# Patient Record
Sex: MALE | Race: OTHER | Employment: OTHER | URBAN - METROPOLITAN AREA
[De-identification: names, ages, dates, MRNs, and addresses within clinical notes are randomized per-mention and may not be internally consistent; named-entity substitution may affect disease eponyms.]

---

## 2022-10-18 ENCOUNTER — HOSPITAL ENCOUNTER (EMERGENCY)
Dept: ULTRASOUND IMAGING | Age: 27
Discharge: HOME | End: 2022-10-21

## 2022-10-18 ENCOUNTER — HOSPITAL ENCOUNTER (EMERGENCY)
Dept: CT IMAGING | Age: 27
Discharge: HOME | End: 2022-10-21

## 2022-10-18 ENCOUNTER — HOSPITAL ENCOUNTER (EMERGENCY)
Age: 27
Discharge: HOME | End: 2022-10-18
Attending: EMERGENCY MEDICINE

## 2022-10-18 VITALS
BODY MASS INDEX: 23.54 KG/M2 | RESPIRATION RATE: 16 BRPM | SYSTOLIC BLOOD PRESSURE: 119 MMHG | DIASTOLIC BLOOD PRESSURE: 102 MMHG | HEART RATE: 84 BPM | HEIGHT: 67 IN | OXYGEN SATURATION: 100 % | WEIGHT: 150 LBS | TEMPERATURE: 98 F

## 2022-10-18 DIAGNOSIS — N45.1 EPIDIDYMITIS: Primary | ICD-10-CM

## 2022-10-18 PROCEDURE — 6370000000 HC RX 637 (ALT 250 FOR IP): Performed by: EMERGENCY MEDICINE

## 2022-10-18 PROCEDURE — 96372 THER/PROPH/DIAG INJ SC/IM: CPT

## 2022-10-18 PROCEDURE — 2500000003 HC RX 250 WO HCPCS: Performed by: EMERGENCY MEDICINE

## 2022-10-18 PROCEDURE — 87491 CHLMYD TRACH DNA AMP PROBE: CPT

## 2022-10-18 PROCEDURE — 6360000002 HC RX W HCPCS: Performed by: EMERGENCY MEDICINE

## 2022-10-18 PROCEDURE — 99284 EMERGENCY DEPT VISIT MOD MDM: CPT

## 2022-10-18 PROCEDURE — 81003 URINALYSIS AUTO W/O SCOPE: CPT

## 2022-10-18 PROCEDURE — 74176 CT ABD & PELVIS W/O CONTRAST: CPT

## 2022-10-18 PROCEDURE — 76870 US EXAM SCROTUM: CPT

## 2022-10-18 RX ORDER — DOXYCYCLINE HYCLATE 100 MG/1
100 CAPSULE ORAL EVERY 12 HOURS SCHEDULED
Status: DISCONTINUED | OUTPATIENT
Start: 2022-10-18 | End: 2022-10-18

## 2022-10-18 RX ORDER — DOXYCYCLINE HYCLATE 100 MG
100 TABLET ORAL 2 TIMES DAILY
Qty: 14 TABLET | Refills: 0 | Status: SHIPPED | OUTPATIENT
Start: 2022-10-18 | End: 2022-10-25

## 2022-10-18 RX ORDER — DOXYCYCLINE HYCLATE 100 MG/1
100 CAPSULE ORAL
Status: COMPLETED | OUTPATIENT
Start: 2022-10-18 | End: 2022-10-18

## 2022-10-18 RX ADMIN — DOXYCYCLINE HYCLATE 100 MG: 100 CAPSULE ORAL at 16:23

## 2022-10-18 RX ADMIN — LIDOCAINE HYDROCHLORIDE 500 MG: 10 INJECTION, SOLUTION INFILTRATION; PERINEURAL at 16:23

## 2022-10-18 ASSESSMENT — PAIN SCALES - GENERAL: PAINLEVEL_OUTOF10: 8

## 2022-10-18 NOTE — ED PROVIDER NOTES
Emergency Department Provider Note                   PCP:                None Provider               Age: 24 y.o. Sex: male       ICD-10-CM    1. Epididymitis  N45.1           DISPOSITION Decision To Discharge 10/18/2022 04:05:14 PM        MDM  Number of Diagnoses or Management Options  Epididymitis  Diagnosis management comments: Epididymitis. No sign of torsion or abscess. No hernia.  1 dose of 500 mg IM Rocephin given here. Doxycycline given here. Discharged with doxycycline prescription. Patient waiting over 4 hours and still no urine obtained from nursing. Will not make patient wait any longer as this is most likely STD in this demographic. Amount and/or Complexity of Data Reviewed  Tests in the radiology section of CPT®: ordered and reviewed    Risk of Complications, Morbidity, and/or Mortality  Presenting problems: moderate  Diagnostic procedures: moderate  Management options: moderate    Patient Progress  Patient progress: stable             Orders Placed This Encounter   Procedures    C.trachomatis N.gonorrhoeae DNA    US SCROTUM AND TESTICLES    CT ABDOMEN PELVIS WO CONTRAST Additional Contrast? None    POCT Urine Dipstick    POCT Urinalysis no Micro        Medications   cefTRIAXone (ROCEPHIN) 500 mg in lidocaine 1 % 1.4286 mL IM Injection (500 mg IntraMUSCular Given 10/18/22 1623)   doxycycline hyclate (VIBRAMYCIN) capsule 100 mg (100 mg Oral Given 10/18/22 1623)       New Prescriptions    DOXYCYCLINE HYCLATE (VIBRA-TABS) 100 MG TABLET    Take 1 tablet by mouth 2 times daily for 7 days        Junior Valery Valdes is a 24 y.o. male who presents to the Emergency Department with chief complaint of    Chief Complaint   Patient presents with    Testicle Pain      29-year-old male comes in with testicular pain. Right-sided. Present for approximately 16 hours. Moderate to severe. Started more or less rapidly after he bent down to  something. Pain constant.   Worse with palpation and twisting and turning. No preceding dysuria hematuria or flank pain. No vomiting. Ate yesterday without throwing up. Has not eaten yet today. No abdominal pain. Pain occasionally radiates towards the abdomen. Review of Systems   All other systems reviewed and are negative. History reviewed. No pertinent past medical history. History reviewed. No pertinent surgical history. No family history on file. Social History     Socioeconomic History    Marital status: Single     Spouse name: None    Number of children: None    Years of education: None    Highest education level: None         Patient has no known allergies. Previous Medications    No medications on file        Vitals signs and nursing note reviewed. Patient Vitals for the past 4 hrs:   BP   10/18/22 1645 (!) 119/102          Physical Exam  Vitals reviewed. Constitutional:       Appearance: Normal appearance. HENT:      Head: Normocephalic and atraumatic. Mouth/Throat:      Mouth: Mucous membranes are moist.      Pharynx: Oropharynx is clear. Eyes:      Extraocular Movements: Extraocular movements intact. Conjunctiva/sclera: Conjunctivae normal.      Pupils: Pupils are equal, round, and reactive to light. Cardiovascular:      Rate and Rhythm: Normal rate and regular rhythm. Heart sounds: Normal heart sounds. Pulmonary:      Effort: Pulmonary effort is normal.   Abdominal:      General: Abdomen is flat. There is no distension. Palpations: Abdomen is soft. Tenderness: There is no abdominal tenderness. Genitourinary:     Comments: Right testicle rests lower than the left testicle. It is moderately to severely tender to palpation throughout both the testicle and the epididymis. There are some mild swelling here, but it does not reduce. No duskiness or redness. Uncircumcised. No discharge. Left testicle unremarkable. Musculoskeletal:         General: No tenderness or deformity.       Cervical back: Normal range of motion and neck supple. Skin:     General: Skin is warm and dry. Capillary Refill: Capillary refill takes less than 2 seconds. Coloration: Skin is not jaundiced. Neurological:      General: No focal deficit present. Mental Status: He is alert. Mental status is at baseline. Procedures    Results for orders placed or performed during the hospital encounter of 10/18/22   [facility] [address] [facility] 10/18/2022 1:25 PM     HISTORY: Right testicular pain. FINDINGS: The testicles are homogeneous in echotexture with scattered  microcalcifications bilaterally. The right testicle measures 4.2 x 2.2 x 3.0 cm. The left testicle measures 3.9 x 1.9 x 2.8 cm. Color Doppler waveform analysis  demonstrates normal arterial waveforms within the testicles. No evidence of  testicular torsion. The right epididymis is mildly enlarged in the region of the  epididymal tail with increased color Doppler flow consistent with epididymitis. The left epididymis is normal. No varicoceles or hydroceles are evident. Impression    1. Normal testicles without torsion or mass. 2. Right epididymal tail enlargement and increased color Doppler flow consistent  with epididymitis. No evidence of left epididymitis. CT ABDOMEN PELVIS WO CONTRAST Additional Contrast? None    Narrative    CT ABDOMEN AND PELVIS    INDICATION:  Right testicular pain since last night. Multiple axial images were obtained through the abdomen and pelvis without  intravenous or oral contrast.  Radiation dose reduction techniques were used for  this study: All CT scans performed at this facility use one or all of the  following: Automated exposure control, adjustment of the mA and/or kVp according  to patient's size, iterative reconstruction. COMPARISON: None    FINDINGS:  LOWER CHEST: Probable pneumatocele posterior left lung base.  This may be related  to prior traumatic injury due to surrounding scarring and architectural  distortion. No pleural fluid. HEPATOBILIARY: Normal.    PANCREAS: Normal.    SPLEEN: Normal.    ADRENAL GLANDS: Normal.    KIDNEYS/BLADDER: Kidneys and bladder are unremarkable. No hydronephrosis or  renal calculi. BOWEL: No bowel obstruction or diverticulitis. Appendix is normal.    LYMPH NODES: No enlarged abdominal or pelvic lymph nodes. VASCULATURE: Unremarkable. PELVIC ORGANS: Uterus not identified. No pelvic free fluid. Rectum is  unremarkable. 2 radiopaque foreign bodies noted in the region of the patient's  penile foreskin of uncertain etiology. MUSCULOSKELETAL: Normal.      Impression    1. No urinary tract calculi or hydronephrosis. No acute changes in the abdomen  or pelvis to account for patient's symptoms. 2. No bowel obstruction, diverticulitis or appendicitis. 3. Probable posttraumatic pneumatocele posterior left lung base. No acute  inflammation or pleural fluid. 4. Foreign bodies in the region of the penile foreskin. These are of uncertain  etiology. US SCROTUM AND TESTICLES   Final Result   1. Normal testicles without torsion or mass. 2. Right epididymal tail enlargement and increased color Doppler flow consistent   with epididymitis. No evidence of left epididymitis. CT ABDOMEN PELVIS WO CONTRAST Additional Contrast? None   Final Result   1. No urinary tract calculi or hydronephrosis. No acute changes in the abdomen   or pelvis to account for patient's symptoms. 2. No bowel obstruction, diverticulitis or appendicitis. 3. Probable posttraumatic pneumatocele posterior left lung base. No acute   inflammation or pleural fluid. 4. Foreign bodies in the region of the penile foreskin. These are of uncertain   etiology. Voice dictation software was used during the making of this note. This software is not perfect and grammatical and other typographical errors may be present.   This note has not been completely proofread for errors.      Pawan Kraft, DO  10/18/22 [id number]

## 2022-10-18 NOTE — DISCHARGE INSTRUCTIONS
We would love to help you get a primary care doctor for follow-up after your emergency department visit. Please call [phone] between 7AM - 6PM Monday to Friday. A care navigator will be able to assist you with setting up a doctor close to your home. You have an infection in the epididymis, which is attached to your testicle. Please take antibiotics as prescribed. Return to ER for any worsening symptom, question or concern.

## 2022-10-18 NOTE — ED TRIAGE NOTES
Patient ambulatory to triage with mask in place. Patient reports right testicle pain since last night. Denies any penile discharge or urinary sx.

## 2022-10-19 LAB
BILIRUB UR QL: NEGATIVE
GLUCOSE UR QL STRIP.AUTO: NEGATIVE MG/DL
KETONES UR-MCNC: NEGATIVE MG/DL
LEUKOCYTE ESTERASE UR QL STRIP: NEGATIVE
NITRITE UR QL: NEGATIVE
PH UR: 6 [PH] (ref 5–9)
PROT UR QL: NEGATIVE MG/DL
RBC # UR STRIP: NEGATIVE /UL
SP GR UR: >1.03 (ref 1–1.02)
UROBILINOGEN UR QL: 0.2 EU/DL (ref 0.2–1)

## 2022-10-20 LAB
C TRACH RRNA SPEC QL NAA+PROBE: NEGATIVE
N GONORRHOEA RRNA SPEC QL NAA+PROBE: NEGATIVE
SPECIMEN SOURCE: NORMAL